# Patient Record
Sex: MALE | Race: WHITE | NOT HISPANIC OR LATINO | URBAN - METROPOLITAN AREA
[De-identification: names, ages, dates, MRNs, and addresses within clinical notes are randomized per-mention and may not be internally consistent; named-entity substitution may affect disease eponyms.]

---

## 2021-02-12 ENCOUNTER — OFFICE VISIT - RIVER FALLS (OUTPATIENT)
Dept: FAMILY MEDICINE | Facility: CLINIC | Age: 36
End: 2021-02-12

## 2021-02-18 ENCOUNTER — OFFICE VISIT - RIVER FALLS (OUTPATIENT)
Dept: FAMILY MEDICINE | Facility: CLINIC | Age: 36
End: 2021-02-18

## 2021-02-18 ENCOUNTER — COMMUNICATION - RIVER FALLS (OUTPATIENT)
Dept: FAMILY MEDICINE | Facility: CLINIC | Age: 36
End: 2021-02-18

## 2021-02-18 ASSESSMENT — MIFFLIN-ST. JEOR: SCORE: 1617.67

## 2022-02-11 VITALS
OXYGEN SATURATION: 95 % | SYSTOLIC BLOOD PRESSURE: 134 MMHG | DIASTOLIC BLOOD PRESSURE: 74 MMHG | WEIGHT: 151.1 LBS | TEMPERATURE: 97 F | HEART RATE: 90 BPM | BODY MASS INDEX: 21.63 KG/M2 | HEIGHT: 70 IN

## 2022-02-16 NOTE — NURSING NOTE
Comprehensive Intake Entered On:  2/18/2021 10:49 AM CST    Performed On:  2/18/2021 10:21 AM CST by Catalina GRIFFIN, Gypsy               Summary   Chief Complaint :   DOT px   Weight Measured :   151.1 lb(Converted to: 151 lb 2 oz, 68.538 kg)    Height Measured :   69.75 in(Converted to: 5 ft 10 in, 177.16 cm)    Body Mass Index :   21.83 kg/m2   Body Surface Area :   1.83 m2   Systolic Blood Pressure :   134 mmHg (HI)    Diastolic Blood Pressure :   74 mmHg   Mean Arterial Pressure :   94 mmHg   Peripheral Pulse Rate :   90 bpm   BP Site :   Right arm   Pulse Site :   Radial artery   BP Method :   Manual   HR Method :   Manual   Temperature Tympanic :   97 DegF(Converted to: 36.1 DegC)  (LOW)    Oxygen Saturation :   95 %   Gypsy Arnold MA - 2/18/2021 10:21 AM CST   Health Status   Allergies Verified? :   Yes   Medication History Verified? :   Yes   Medical History Verified? :   Yes   Pre-Visit Planning Status :   Completed   Tobacco Use? :   Current every day smoker   Gypsy Arnold MA - 2/18/2021 10:21 AM CST   Consents   Consent for Immunization Exchange :   Consent Granted   Consent for Immunizations to Providers :   Consent Granted   Gypsy Arnold MA 2/18/2021 10:21 AM CST   Meds / Allergies   (As Of: 2/18/2021 10:49:48 AM CST)   Allergies (Active)   No known allergies  Estimated Onset Date:   Unspecified ; Created By:   Generated Domain User for [id number]; Reaction Status:   Active ; Substance:   No known allergies ; Updated By:   Generated Domain User for [id number]; Reviewed Date:   2/11/2014 12:00 AM CST        Medication List   (As Of: 2/18/2021 10:49:48 AM CST)   No Known Home Medications     Gypsy Arnold MA - 2/18/2021 7:58:13 AM           Vision Testing POC   Corrective Lenses :   None   Color Blind Correct Plates :   normal   Eye, Left Visual Acuity :   20/13   Eye, Right Visual Acuity :   20/13   Eye, Bilateral Visual Acuity :   20/13   Gypsy Arnold MA - 2/18/2021 10:21 AM CST   ID Risk Screen    Recent Travel History :   No recent travel   Family Member Travel History :   No recent travel   Other Exposure to Infectious Disease :   Unknown   COVID-19 Testing Status :   No COVID-19 test performed   Gypsy Arnold MA - 2/18/2021 10:21 AM CST   Social History   Social History   (As Of: 2/18/2021 10:49:48 AM CST)   Tobacco:        chewing tobacco   (Last Updated: 2/18/2021 10:47:26 AM CST by Gypsy Arnold MA)          Electronic Cigarette/Vaping:        Electronic Cigarette Use: Never.   (Last Updated: 2/18/2021 10:47:31 AM CST by Gypsy Arnold MA)

## 2022-02-16 NOTE — PROGRESS NOTES
Patient:   JOHN GARCIA            MRN: [id number]            FIN: [id number]               Age:   35 years     Sex:  Male     :  1985   Associated Diagnoses:   's permit physical examination   Author:   Radames Posey PA-C      Visit Information      Date of Service: 2021 10:00 am  Performing Location: [facility]  Encounter#: [id number]      Primary Care Provider (PCP):  [id number] ERLINDA MACK      Referring Provider:  Radames Posey PA-C    NPI# [id number]   Visit type:  DOT exam.    Accompanied by:  No one.    Source of history:  Self.    Referral source:  Self.       Chief Complaint   2021 10:21 AM CST   DOT px     Here for DOT exam.      Well Adult History   Well Adult History   See scanned DOT form for history..        Review of Systems   Constitutional:  Negative.    Eye:  Negative.    Ear/Nose/Mouth/Throat:  Negative.    Respiratory:  Negative.    Cardiovascular:  Negative.    Gastrointestinal:  Negative.    Genitourinary:  Negative.    Hematology/Lymphatics:  Negative.    Endocrine:  Negative.    Immunologic:  Negative.    Musculoskeletal:  Negative.    Integumentary:  Negative.    Neurologic:  Negative.    Psychiatric:  Negative.    All other systems reviewed and negative      Health Status   Allergies:    Allergic Reactions (All)  Severity Not Documented  No known allergies (No reactions were documented)   Medications:  (Selected)      Problem list:    All Problems  Resolved: Mood disorder / SNOMED CT 21784842  Resolved: ADHD (attention deficit hyperactivity disorder) / SNOMED CT 0010975141      Histories   Past Medical History:    Resolved  Mood disorder (02935863): Onset in  at 15 years.  Resolved.  ADHD (attention deficit hyperactivity disorder) (9794984337): Onset in  at 6 years.  Resolved.   Family History:       Procedure history:    Myringotomy and insertion of T tube ([id number]) on 1991 at 6 Years.  Pharyngeal reconstruction with  local flap ([id number]) on 7/26/1991 at 6 Years.  Comments:  5/7/2014 3:12 PM CDT - Mamie Elliott  Congenital cleft palate.  Myringotomy and insertion of T tube ([id number]) on 6/14/1988 at 3 Years.  Myringotomy and insertion of T tube ([id number]) on 5/12/1987 at 23 Months.  Comments:  5/7/2014 3:13 PM CDT - Lexi  Mamie  Bilateral  Palatoplasty for cleft palate ([id number]) on 7/31/1986 at 13 Months.  Circumcision (64.0) on 1985 at 1 Days.   Social History:        Electronic Cigarette/Vaping Assessment            Electronic Cigarette Use: Never.      Tobacco Assessment            chewing tobacco        Physical Examination   Vital Signs   2/18/2021 10:21 AM CST Temperature Tympanic 97 DegF  LOW    Peripheral Pulse Rate 90 bpm    Pulse Site Radial artery    HR Method Manual    Systolic Blood Pressure 134 mmHg  HI    Diastolic Blood Pressure 74 mmHg    Mean Arterial Pressure 94 mmHg    BP Site Right arm    BP Method Manual    Oxygen Saturation 95 %      Measurements from flowsheet : Measurements   2/18/2021 10:21 AM CST Height Measured - Standard 69.75 in    Weight Measured - Standard 151.1 lb    BSA 1.83 m2    Body Mass Index 21.83 kg/m2      General:  Alert and oriented, No acute distress.    Eye:  Pupils are equal, round and reactive to light, Extraocular movements are intact, Normal conjunctiva, Vision unchanged.    HENT:  Normocephalic, Tympanic membranes are clear, Normal hearing, Oral mucosa is moist, No pharyngeal erythema.    Neck:  Supple, Non-tender, No lymphadenopathy.    Respiratory:  Lungs are clear to auscultation, Respirations are non-labored, Breath sounds are equal.    Cardiovascular:  Normal rate, Regular rhythm, No murmur.    Gastrointestinal:  Soft, Non-tender, Non-distended, Normal bowel sounds, No organomegaly.    Genitourinary:  No costovertebral angle tenderness.    Musculoskeletal:  Normal range of motion, Normal strength, No tenderness, Normal gait.    Integumentary:  Warm, Moist,  No rash.    Neurologic:  Alert, Oriented, No focal deficits, Normal deep tendon reflexes.    Psychiatric:  Cooperative, Appropriate mood & affect.       Health Maintenance      Recommendations     Pending (in the next year)        OverDue           Influenza Vaccine due  09/01/20  and every 1  year(s)        Due            Alcohol Misuse Screen due  02/18/21  and every 1  year(s)           Depression Screen (Male) due  02/18/21  and every 1  year(s)           Lipid Disorders Screen (Male) due  02/18/21  and every 1  year(s)           Tetanus Vaccine due  02/18/21  and every 10  year(s)     Satisfied (in the past 1 year)        Satisfied            Body Mass Index Check on  02/18/21.           High Blood Pressure Screen (Male) on  02/18/21.           Tobacco Use Screen (Male) on  02/18/21.          Review / Management   Results review:  See UA report..       Impression and Plan   Diagnosis     's permit physical examination ([id number]-FW Z02.4).     Cleared for two years.

## 2022-02-16 NOTE — TELEPHONE ENCOUNTER
---------------------  From: Akua Billingsley   To: Radames Posey PA-C;     Sent: 2/12/2021 2:04:36 PM CST  Subject: Scheduling Management     Patient no showed appointment. Appointment was for [facility] / DOT PX / ANA / SABINE/ [facility] [employer]---------------------  From: Radames Posey PA-C   To: Akua Billingsley;     Sent: 2/12/2021 2:15:01 PM CST  Subject: RE: Scheduling Management     Noted    KAH